# Patient Record
Sex: MALE | Race: WHITE | Employment: FULL TIME | URBAN - METROPOLITAN AREA
[De-identification: names, ages, dates, MRNs, and addresses within clinical notes are randomized per-mention and may not be internally consistent; named-entity substitution may affect disease eponyms.]

---

## 2018-09-27 ENCOUNTER — HOSPITAL ENCOUNTER (EMERGENCY)
Age: 18
Discharge: HOME | End: 2018-09-27
Payer: COMMERCIAL

## 2018-09-27 VITALS
OXYGEN SATURATION: 100 % | TEMPERATURE: 98 F | DIASTOLIC BLOOD PRESSURE: 67 MMHG | HEART RATE: 73 BPM | BODY MASS INDEX: 32.49 KG/M2 | WEIGHT: 220 LBS | SYSTOLIC BLOOD PRESSURE: 120 MMHG | RESPIRATION RATE: 16 BRPM

## 2018-09-27 DIAGNOSIS — H66.90 ACUTE OTITIS MEDIA, UNSPECIFIED OTITIS MEDIA TYPE: Primary | ICD-10-CM

## 2018-09-27 PROCEDURE — 99212 OFFICE O/P EST SF 10 MIN: CPT

## 2018-09-27 RX ORDER — AZITHROMYCIN 250 MG/1
TABLET, FILM COATED ORAL
Qty: 6 TABLET | Refills: 0 | Status: SHIPPED | OUTPATIENT
Start: 2018-09-27 | End: 2018-10-07

## 2018-09-27 ASSESSMENT — PAIN DESCRIPTION - LOCATION: LOCATION: EAR

## 2018-09-27 ASSESSMENT — PAIN DESCRIPTION - ORIENTATION: ORIENTATION: LEFT

## 2018-09-27 ASSESSMENT — PAIN DESCRIPTION - PAIN TYPE: TYPE: ACUTE PAIN

## 2018-09-27 ASSESSMENT — PAIN DESCRIPTION - DESCRIPTORS: DESCRIPTORS: ACHING;DISCOMFORT

## 2018-09-27 ASSESSMENT — PAIN DESCRIPTION - FREQUENCY: FREQUENCY: CONTINUOUS

## 2018-09-27 ASSESSMENT — PAIN SCALES - GENERAL: PAINLEVEL_OUTOF10: 4

## 2018-09-27 ASSESSMENT — PAIN DESCRIPTION - PROGRESSION: CLINICAL_PROGRESSION: GRADUALLY WORSENING

## 2018-09-27 NOTE — ED PROVIDER NOTES
Department of Emergency Medicine   ED  Provider Note  Admit Date/RoomTime: 9/27/2018 12:45 PM  ED Room: 04/04    Chief Complaint:   Otalgia (c/o left ear pain x a few days - worse this am )    History of Present Illness      Godfrey Marion is a 19 y.o. old male who presents to the ED for Left ear pain that has been ongoing for the past few days. He states he did have a cough and congestion with sore throat last week but denies this now. He has no difficulty with breathing or swallowing. He has no chest pain, shortness of breath, difficulty handling secretions, abdominal pain, nausea and vomiting, dizziness, or recent trauma/injury. Patient is alert and oriented ×3 and in no apparent distress at this exam. He is nontoxic appearing. ROS   Pertinent positives and negatives are stated within HPI, all other systems reviewed and are negative. Past Medical History:  has no past medical history on file. Past Surgical History:  has no past surgical history on file. Social History:  reports that he has never smoked. He has never used smokeless tobacco. He reports that he does not drink alcohol or use drugs. Family History: family history is not on file. The patients home medications have been reviewed. Allergies: Patient has no known allergies. Allergies have been reviewed with patient. Physical Exam   VS:  /67   Pulse 73   Temp 98 °F (36.7 °C) (Oral)   Resp 16   Wt 220 lb (99.8 kg)   SpO2 100%   BMI 32.49 kg/m²      Oxygen Saturation Interpretation: Normal.    Constitutional:  Alert, development consistent with age. NAD  Eyes: EOMI, non-injected conjunctiva   Ears:  External Ears: Bilateral normal.              TM's & External Canals: Slight redness and fluid to right, bulging and redness to left TM  Throat: no erythema or exudates noted. , uvula midline, Airway patent   no mastoid tenderness  Neck/Lymphatic: Supple. There is no cervical node tenderness.  No meningeal signs [id number]             Electronically signed by Olman Carranza PA-C   DD: 9/27/18  **This report was transcribed using voice recognition software. Every effort was made to ensure accuracy; however, inadvertent computerized transcription errors may be present.     END OF ED PROVIDER NOTE          Olman Carranza PA-C  09/27/18 [id number]

## 2019-09-28 ENCOUNTER — HOSPITAL ENCOUNTER (EMERGENCY)
Age: 19
Discharge: HOME | End: 2019-09-28
Payer: MEDICAID

## 2019-09-28 VITALS
RESPIRATION RATE: 16 BRPM | DIASTOLIC BLOOD PRESSURE: 61 MMHG | HEART RATE: 70 BPM | TEMPERATURE: 97.9 F | OXYGEN SATURATION: 98 % | WEIGHT: 230 LBS | SYSTOLIC BLOOD PRESSURE: 119 MMHG | BODY MASS INDEX: 33.97 KG/M2

## 2019-09-28 DIAGNOSIS — R00.2 PALPITATIONS: Primary | ICD-10-CM

## 2019-09-28 LAB
BASOPHILS ABSOLUTE: 0.02 E9/L (ref 0–0.2)
BASOPHILS RELATIVE PERCENT: 0.3 % (ref 0–2)
CO2: 26 MMOL/L (ref 22–29)
EOSINOPHILS ABSOLUTE: 0.06 E9/L (ref 0.05–0.5)
EOSINOPHILS RELATIVE PERCENT: 1 % (ref 0–6)
GFR AFRICAN AMERICAN: >60
GFR NON-AFRICAN AMERICAN: >60 ML/MIN/1.73
GLUCOSE BLD-MCNC: 83 MG/DL (ref 74–99)
HCT VFR BLD CALC: 43.8 % (ref 37–54)
HEMOGLOBIN: 13.9 G/DL (ref 12.5–16.5)
IMMATURE GRANULOCYTES #: 0.01 E9/L
IMMATURE GRANULOCYTES %: 0.2 % (ref 0–5)
LYMPHOCYTES ABSOLUTE: 2.15 E9/L (ref 1.5–4)
LYMPHOCYTES RELATIVE PERCENT: 34.5 % (ref 20–42)
MCH RBC QN AUTO: 29 PG (ref 26–35)
MCHC RBC AUTO-ENTMCNC: 31.7 % (ref 32–34.5)
MCV RBC AUTO: 91.3 FL (ref 80–99.9)
MONOCYTES ABSOLUTE: 0.6 E9/L (ref 0.1–0.95)
MONOCYTES RELATIVE PERCENT: 9.6 % (ref 2–12)
NEUTROPHILS ABSOLUTE: 3.39 E9/L (ref 1.8–7.3)
NEUTROPHILS RELATIVE PERCENT: 54.4 % (ref 43–80)
PDW BLD-RTO: 12 FL (ref 11.5–15)
PLATELET # BLD: 327 E9/L (ref 130–450)
PMV BLD AUTO: 9 FL (ref 7–12)
POC ANION GAP: 13 MMOL/L (ref 7–16)
POC BUN: 11 MG/DL (ref 8–23)
POC CHLORIDE: 103 MMOL/L (ref 100–108)
POC CREATININE: 0.9 MG/DL (ref 0.7–1.2)
POC POTASSIUM: 4 MMOL/L (ref 3.5–5)
POC SODIUM: 142 MMOL/L (ref 132–146)
RBC # BLD: 4.8 E12/L (ref 3.8–5.8)
WBC # BLD: 6.2 E9/L (ref 4.5–11.5)

## 2019-09-28 PROCEDURE — 99212 OFFICE O/P EST SF 10 MIN: CPT

## 2019-09-28 PROCEDURE — 82565 ASSAY OF CREATININE: CPT

## 2019-09-28 PROCEDURE — 85025 COMPLETE CBC W/AUTO DIFF WBC: CPT

## 2019-09-28 PROCEDURE — 82947 ASSAY GLUCOSE BLOOD QUANT: CPT

## 2019-09-28 PROCEDURE — 80051 ELECTROLYTE PANEL: CPT

## 2019-09-28 PROCEDURE — 36415 COLL VENOUS BLD VENIPUNCTURE: CPT

## 2019-09-28 PROCEDURE — 84520 ASSAY OF UREA NITROGEN: CPT
